# Patient Record
Sex: MALE | HISPANIC OR LATINO | Employment: FULL TIME | URBAN - METROPOLITAN AREA
[De-identification: names, ages, dates, MRNs, and addresses within clinical notes are randomized per-mention and may not be internally consistent; named-entity substitution may affect disease eponyms.]

---

## 2017-04-27 ENCOUNTER — OCCUPATIONAL MEDICINE (OUTPATIENT)
Dept: URGENT CARE | Facility: PHYSICIAN GROUP | Age: 27
End: 2017-04-27
Payer: COMMERCIAL

## 2017-04-27 ENCOUNTER — HOSPITAL ENCOUNTER (OUTPATIENT)
Dept: RADIOLOGY | Facility: MEDICAL CENTER | Age: 27
End: 2017-04-27
Attending: FAMILY MEDICINE
Payer: COMMERCIAL

## 2017-04-27 VITALS
SYSTOLIC BLOOD PRESSURE: 100 MMHG | DIASTOLIC BLOOD PRESSURE: 70 MMHG | TEMPERATURE: 98.6 F | HEART RATE: 55 BPM | WEIGHT: 179 LBS | OXYGEN SATURATION: 97 % | RESPIRATION RATE: 16 BRPM

## 2017-04-27 DIAGNOSIS — Z02.1 PRE-EMPLOYMENT DRUG SCREENING: ICD-10-CM

## 2017-04-27 DIAGNOSIS — S61.512A LACERATION OF LEFT WRIST, INITIAL ENCOUNTER: ICD-10-CM

## 2017-04-27 LAB
AMP AMPHETAMINE: NORMAL
AMP AMPHETAMINE: NORMAL
BREATH ALCOHOL COMMENT: NORMAL
COC COCAINE: NORMAL
COC COCAINE: NORMAL
INT CON NEG: NEGATIVE
INT CON NEG: NORMAL
INT CON POS: NORMAL
INT CON POS: POSITIVE
MET METHAMPHETAMINES: NORMAL
MET METHAMPHETAMINES: NORMAL
OPI OPIATES: NORMAL
OPI OPIATES: NORMAL
PCP PHENCYCLIDINE: NORMAL
PCP PHENCYCLIDINE: NORMAL
POC BREATHALIZER: 0 PERCENT (ref 0–0.01)
POC DRUG COMMENT 753798-OCCUPATIONAL HEALTH: NORMAL
POC DRUG COMMENT 753798-OCCUPATIONAL HEALTH: NORMAL
THC: NORMAL
THC: NORMAL

## 2017-04-27 PROCEDURE — 82075 ASSAY OF BREATH ETHANOL: CPT | Performed by: FAMILY MEDICINE

## 2017-04-27 PROCEDURE — 80305 DRUG TEST PRSMV DIR OPT OBS: CPT | Performed by: FAMILY MEDICINE

## 2017-04-27 PROCEDURE — 90471 IMMUNIZATION ADMIN: CPT | Performed by: FAMILY MEDICINE

## 2017-04-27 PROCEDURE — 90715 TDAP VACCINE 7 YRS/> IM: CPT | Performed by: FAMILY MEDICINE

## 2017-04-27 PROCEDURE — 73110 X-RAY EXAM OF WRIST: CPT | Mod: LT

## 2017-04-27 PROCEDURE — 12001 RPR S/N/AX/GEN/TRNK 2.5CM/<: CPT | Mod: 59 | Performed by: FAMILY MEDICINE

## 2017-04-27 RX ORDER — KETOROLAC TROMETHAMINE 30 MG/ML
60 INJECTION, SOLUTION INTRAMUSCULAR; INTRAVENOUS ONCE
Status: COMPLETED | OUTPATIENT
Start: 2017-04-27 | End: 2017-04-27

## 2017-04-27 RX ADMIN — KETOROLAC TROMETHAMINE 60 MG: 30 INJECTION, SOLUTION INTRAMUSCULAR; INTRAVENOUS at 10:23

## 2017-04-27 NOTE — MR AVS SNAPSHOT
Moralesmadelin Shayo   2017 9:00 AM   Occupational Medicine   MRN: [id number]    Department:  [facility]   Dept Phone:  [phone]    Description:  Male : 1990   Provider:  Zeferino Crawford M.D.           Reason for Visit     Laceration WC metal plate fell down and cut his wrist      Allergies as of 2017     No Known Allergies      You were diagnosed with     Laceration of left wrist, initial encounter   [093867]       Pre-employment drug screening   [168558]         Vital Signs     Blood Pressure Pulse Temperature Respirations Weight Oxygen Saturation    100/70 mmHg 55 37 °C (98.6 °F) 16 81.194 kg (179 lb) 97%    Smoking Status                   Never Smoker            Basic Information     Date Of Birth Sex Race Ethnicity Preferred Language    1990 Male Unable to Obtain  Origin (Moldovan,Albanian,Bruneian,Juan, etc) Moldovan      Health Maintenance     Patient has no pending health maintenance at this time      Results     POCT Breath Alcohol Test      Component Value Standard Range & Units    POC Breathalizer 0.000 0.00 - 0.01 Percent    Breath Alcohol Comment                  POCT 6 Panel Urine Drug Screen      Component    AMPHETAMINE    neg    POC THC    neg    COCAINE    neg    OPIATES    neg    PHENCYCLIDINE    neg    METHAMPHETAMINES    neg    POC Urine Drug Screen Comment    neg    Internal Control Positive    Valid    Internal Control Negative    Valid                        Current Immunizations     Tdap Vaccine 2017 10:43 AM      Below and/or attached are the medications your provider expects you to take. Review all of your home medications and newly ordered medications with your provider and/or pharmacist. Follow medication instructions as directed by your provider and/or pharmacist. Please keep your medication list with you and share with your provider. Update the information when medications are discontinued, doses are changed, or new medications  (including over-the-counter products) are added; and carry medication information at all times in the event of emergency situations     Allergies:  No Known Allergies          Medications  Valid as of: April 27, 2017 - 11:54 AM    Generic Name Brand Name Tablet Size Instructions for use    .                 Medicines prescribed today were sent to:     [facility] [address] [place] [address], [place] - [id number] [facility] [place] [address]    Phone: [phone] Fax: [phone]    Open 24 Hours?: No      Medication refill instructions:       If your prescription bottle indicates you have medication refills left, it is not necessary to call your provider’s office. Please contact your pharmacy and they will refill your medication.    If your prescription bottle indicates you do not have any refills left, you may request refills at any time through one of the following ways: The online [email or web address] system (except Urgent Care), by calling your provider’s office, or by asking your pharmacy to contact your provider’s office with a refill request. Medication refills are processed only during regular business hours and may not be available until the next business day. Your provider may request additional information or to have a follow-up visit with you prior to refilling your medication.   *Please Note: Medication refills are assigned a new Rx number when refilled electronically. Your pharmacy may indicate that no refills were authorized even though a new prescription for the same medication is available at the pharmacy. Please request the medicine by name with the pharmacy before contacting your provider for a refill.        Your To Do List     Future Labs/Procedures Complete By Expires    DX-WRIST-COMPLETE 3+ LEFT  As directed 10/25/2017         [email or web address] Access Code: [email or web address]  Expires: 5/27/2017 11:54 AM    Your email address is not on file at [facility].  Email Addresses are required for you to sign  up for [employer], please contact [phone] to verify your personal information and to provide your email address prior to attempting to register for [employer].    Morales Rafiq  [address] [place] [id number]    [employer]  A secure, online tool to manage your health information     [employer]’s [employer]® is a secure, online tool that connects you to your personalized health information from the privacy of your home -- day or night - making it very easy for you to manage your healthcare. Once the activation process is completed, you can even access your medical information using the [employer] keenan, which is available for free in the Apple Keenan store or Google Play store.     To learn more about [employer], visit [email or web address]    There are two levels of access available (as shown below):   My Chart Features  [facility] Primary Care Doctor [facility]  Specialists [facility]  Urgent  Care Non-[facility] Primary Care Doctor   Email your healthcare team securely and privately 24/7 X X X    Manage appointments: schedule your next appointment; view details of past/upcoming appointments X      Request prescription refills. X      View recent personal medical records, including lab and immunizations X X X X   View health record, including health history, allergies, medications X X X X   Read reports about your outpatient visits, procedures, consult and ER notes X X X X   See your discharge summary, which is a recap of your hospital and/or ER visit that includes your diagnosis, lab results, and care plan X X  X     How to register for [employer]:  Once your e-mail address has been verified, follow the following steps to sign up for [employer].     1. Go to  [email or web address]  2. Click on the Sign Up Now box, which takes you to the New Member Sign Up page. You will need to provide the following information:  a. Enter your [employer] Access Code exactly as it appears at the top of this page. (You will not need to use this code after you’ve  completed the sign-up process. If you do not sign up before the expiration date, you must request a new code.)   b. Enter your date of birth.   c. Enter your home email address.   d. Click Submit, and follow the next screen’s instructions.  3. Create a ScaleBaset ID. This will be your [employer] login ID and cannot be changed, so think of one that is secure and easy to remember.  4. Create a WeDidIt password. You can change your password at any time.  5. Enter your Password Reset Question and Answer. This can be used at a later time if you forget your password.   6. Enter your e-mail address. This allows you to receive e-mail notifications when new information is available in WeDidIt.  7. Click Sign Up. You can now view your health information.    For assistance activating your [employer] account, call [phone]

## 2017-04-27 NOTE — Clinical Note
EMPLOYEE’S CLAIM FOR COMPENSATION/ REPORT OF INITIAL TREATMENT  FORM C-4    EMPLOYEE’S CLAIM - PROVIDE ALL INFORMATION REQUESTED   First Name  Andrew Last Name  Rafiq Birthdate                    1990                Sex  male Claim Number   Home Address  [address] Age  27 y.o. Height   Weight  81.194 kg (179 lb) [place] [place]     [place]  [address] Telephone  [phone] (home)    Mailing Address  [address] [place]  [address] Primary Language Spoken  Portuguese    Insurer  [employer] [facility] self Insured Third Party   [facility]   Employee's Occupation (Job Title) When Injury or Occupational Disease Occurred  [employer]    Employer's Name    [employer] Telephone  [phone]    Employer Address   [address]  [address]      Date of Injury  4/27/2017               Hour of Injury  8:30 AM Date Employer Notified  4/27/2017 Last Day of Work after Injury or Occupational Disease  4/27/2017 Supervisor to Whom Injury Reported  Souleymane Salazar   Address or Location of Accident (if applicable)  [[facility] [address]]   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 (Be specific an answer in detail. 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  n/a Witnesses to the Accident  Lorenzo Rodriguez      Nature of Injury or Occupational Disease  Laceration  Part(s) of Body Injured or Affected  Wrist (L) and Hand (L), N/A, N/A    I certify that the above is true and correct to the best of my knowledge and that I have provided this information in order to obtain the benefits of [place]  Insurance [place] or [place]).  I hereby authorize any physician, chiropractor, surgeon, practitioner, or other person, any hospital, including [facility] hospital, any medical service organization, any insurance company, or other institution or organization to release to each other, any medical or other information, including benefits paid or payable, pertinent to this injury or disease, except information relative to diagnosis, treatment and/or counseling for AIDS, psychological conditions, alcohol or controlled substances, for which I must give specific authorization.  A Photostat of this authorization shall be as valid as the original.     Date   Place   Employee’s Signature   THIS REPORT MUST BE COMPLETED AND MAILED WITHIN 3 WORKING DAYS OF TREATMENT   Place  [facility]  Name of Facility  [place]   Date  4/27/2017 Diagnosis  (S61.512A) Laceration of left wrist, initial encounter  (Z02.1) Pre-employment drug screening Is there evidence the injured employee was under the influence of alcohol and/or another controlled substance at the time of accident?   Hour  9:41 AM Description of Injury or Disease  Diagnoses of Laceration of left wrist, initial encounter and Pre-employment drug screening were pertinent to this visit. No   Treatment   Laceration of left wrist, initial encounter  Xray personally reviewed.  There is no foreign body or fracture.   some improvement after toradol injection    Wound repaired with dermabond    Work restrictions per D39    RTC in 3-5  d for wound check.                  Tetanus vaccination given.  Have you advised the patient to remain off work five days or more? No   X-Ray Findings  Negative   If Yes   From Date  To Date      From information given by the employee, together with medical evidence, can you directly connect this injury or occupational disease as job incurred?  Yes  "If No Full Duty  No Modified Duty  Yes   Is additional medical care by a physician indicated?  Yes If Modified Duty, Specify any Limitations / Restrictions  Restrictions per D39\     Do you know of any previous injury or disease contributing to this condition or occupational disease?                            No   Date  4/27/2017 Print Doctor’s Name Zeferino Crawford M.D. I certify the employer’s copy of  this form was mailed on:   Address  [address] Insurer’s Use Only     [place]  [address]    Provider’s Tax ID Number  [id number]  Telephone  Dept: [phone]        e-ZEFERINO Horton M.D.   e-Signature: Dr. Chaz Germain, Medical Director Degree  MD        ORIGINAL-TREATING PHYSICIAN OR CHIROPRACTOR    PAGE 2-INSURER/TPA    PAGE 3-EMPLOYER    PAGE 4-EMPLOYEE             Form C-4 (rev10/07)              BRIEF DESCRIPTION OF RIGHTS AND BENEFITS  (Pursuant to [place]RS 616C.050)    Notice of Injury or Occupational Disease (Incident Report Form C-1): If an injury or occupational disease (OD) arises out of and in the  course of employment, you must provide written notice to your employer as soon as practicable, but no later than 7 days after the accident or  OD. Your employer shall maintain a sufficient supply of the required forms.    Claim for Compensation (Form C-4): If medical treatment is sought, the form C-4 is available at the place of initial treatment. A completed  \"Claim for Compensation\" (Form C-4) must be filed within 90 days after an accident or OD. The treating physician or chiropractor must,  within 3 working days after treatment, complete and mail to the employer, the employer's insurer and third-party , the Claim for  Compensation.    Medical Treatment: If you require medical treatment for your on-the-job injury or OD, you may be required to select a physician or  chiropractor from a list provided by your workers’ compensation insurer, if it has " contracted with an Organization for Managed Care (MCO) or  Preferred Provider Organization (PPO) or providers of health care. If your employer has not entered into a contract with an MCO or PPO, you  may select a physician or chiropractor from the Panel of Physicians and Chiropractors. Any medical costs related to your industrial injury or  OD will be paid by your insurer.    Temporary Total Disability (TTD): If your doctor has certified that you are unable to work for a period of at least 5 consecutive days, or 5  cumulative days in a 20-day period, or places restrictions on you that your employer does not accommodate, you may be entitled to TTD  compensation.    Temporary Partial Disability (TPD): If the wage you receive upon reemployment is less than the compensation for TTD to which you are  entitled, the insurer may be required to pay you TPD compensation to make up the difference. TPD can only be paid for a maximum of 24  months.    Permanent Partial Disability (PPD): When your medical condition is stable and there is an indication of a PPD as a result of your injury or  OD, within 30 days, your insurer must arrange for an evaluation by a rating physician or chiropractor to determine the degree of your PPD. The  amount of your PPD award depends on the date of injury, the results of the PPD evaluation and your age and wage.    Permanent Total Disability (PTD): If you are medically certified by a treating physician or chiropractor as permanently and totally disabled  and have been granted a PTD status by your insurer, you are entitled to receive monthly benefits not to exceed 66 2/3% of your average  monthly wage. The amount of your PTD payments is subject to reduction if you previously received a PPD award.    Vocational Rehabilitation Services: You may be eligible for vocational rehabilitation services if you are unable to return to the job due to a  permanent physical impairment or permanent restrictions as a  result of your injury or occupational disease.    Transportation and Per Rula Reimbursement: You may be eligible for travel expenses and per rula associated with medical treatment.    Reopening: You may be able to reopen your claim if your condition worsens after claim closure.    Appeal Process: If you disagree with a written determination issued by the insurer or the insurer does not respond to your request, you may  appeal to the Department of Administration, , by following the instructions contained in your determination letter. You must  appeal the determination within 70 days from the date of the determination letter at [address], [address], [place], [place]  [address], or [address], [address], [place], [place] [address]. If you disagree with the  decision, you may appeal to the  Department of Administration, . You must file your appeal within 30 days from the date of the  decision  letter at [address], [address], [place], [place] [address], or [address], [place], [place] [address]. If you  disagree with a decision of an , you may file a petition for judicial review with the District Court. You must do so within 30  days of the Appeal Officer’s decision. You may be represented by an  at your own expense or you may contact the [facility] for possible  representation.    [place]  for Injured Workers (NAIW): If you disagree with a  decision, you may request that NAIW represent you  without charge at an  Hearing. For information regarding denial of benefits, you may contact the [facility] at: [address], [address], [place] [address], [phone], or [address], [address], [place] [address], [phone]    To File a Complaint with the Division: If you wish to file a complaint with the  of the Division of  Industrial Relations (DIR),  please contact the Workers’ Compensation Section, [address], [address], [place], [place] [address], telephone [phone], or  [address], [address], [place], [place] [address], telephone [phone].    For assistance with Workers’ Compensation Issues: you may contact the Office of the [facility] Consumer Health Assistance, [address] [place] [address], [address], [place], [place] [address], Toll Free 1-777.512.8673, Web site: http://govBoracci.[facility].nv., E-mail  [email or web address].                                                                                                                                                                                                                                   __________________________________________________________________                                                                   _________________                Employee Name / Signature                                                                                                                                                       Date                                                                                                                                                                                                     D-2 (rev. 10/07)

## 2017-04-27 NOTE — PROGRESS NOTES
Subjective:      Chief Complaint   Patient presents with   • Laceration     WC metal plate fell down and cut his wrist                Laceration   The incident occurred less than 1 hour ago.  location: left wrist - states he a 2x4 with a metal plate fell on left wrist and he sustained a small laceration.   He now has constant wrist soreness, worse with movement.    The laceration is 2 cm in size. The laceration mechanism was a metal edge. The pain is moderate. The pain has been constant since onset.  tetanus status is: not current        Past medical history was unremarkable and not pertinent to current issue  Social hx - denies tobacco, alcohol, drug use  Family hx was reviewed - no pertinent past family hx    Review of Systems   Cardio - denies chest pain  resp - denies SOB.   Neurological: Negative for tingling, sensory change and focal weakness.   All other systems reviewed and are negative.         Objective:     Blood pressure 100/70, pulse 55, temperature 37 °C (98.6 °F), resp. rate 16, weight 81.194 kg (179 lb), SpO2 97 %.      Physical Exam   Constitutional: pt is oriented to person, place, and time. Pt appears well-developed. No distress.   HENT:   Head: Normocephalic and atraumatic.   Eyes: Conjunctivae are normal.   Cardiovascular: Normal rate.    Pulmonary/Chest: Effort normal.   Musculoskeletal:   Pt exhibits tenderness over left wrist.   There is a superficial linear 2.5 cm lac.   there is no active bleeding .    normal range of motion and normal capillary refill. Normal sensation noted. Normal strength noted.           Neurological: He is alert and oriented to person, place, and time.   Skin: Skin is warm. Pt is not diaphoretic. No erythema.   Psychiatric:  behavior is normal.   Nursing note and vitals reviewed.         Narrative        4/27/2017 10:22 AM    HISTORY/REASON FOR EXAM:  Laceration left wrist.      TECHNIQUE/EXAM DESCRIPTION AND NUMBER OF VIEWS:  4 views of the LEFT wrist.    COMPARISON:  None    FINDINGS:      There is focal swelling over the volar aspect of the left wrist and palm of the hand.    There is no evidence of displaced  fracture or dislocation.    There is no evidence of a radiopaque foreign body.         Impression        1.  There is focal swelling and soft tissue injury consistent with history of laceration. There is no foreign body or fracture.         Reading Provider Reading Date     Beverly Oreilly M.D. Apr 27, 2017        lac repair:  The wound was thoroughly irrigated with copious amount of normal saline.   Area was then prepped in the usual sterile fashion.    Wound was cleansed   Wound explored and found to be relatively superficial and no foreign bodies appreciated.  I approximated the wound edges and closed the laceration with several layers of dermabond reinforced with steri srips.  Area was then cleansed and dressed.    Wound care instructions and ER precautions given.         Assessment/Plan:          1. Laceration of left wrist, initial encounter  Xray personally reviewed.  There is no foreign body or fracture.   some improvement after toradol injection    Wound repaired with dermabond    Work restrictions per D39    RTC in 3-5  d for wound check.                  Tetanus vaccination given.          Rx bactrim for wound infection prophylaxis        Tetanus vaccination given.

## 2017-04-27 NOTE — Clinical Note
[facility] [address] [facility] [place] [address]  Phone: [phone] - Fax: [phone]        [facility] Progress Report and Disability Certification  Date of Service: 4/27/2017   No Show:  No  Date / Time of Next Visit: 4/27/2017   Claim Information   Patient Name: Andrew Conroy  Claim Number:     Employer:   [employer]   Date of Injury: 4/27/2017     Insurer / TPA: [employer]  ID / SSN:    Occupation: [employer]  Diagnosis: Diagnoses of Laceration of left wrist, initial encounter and Pre-employment drug screening were pertinent to this visit.    Medical Information   Related to Industrial Injury? Yes    Subjective Complaints:  Laceration   The incident occurred less than 1 hour ago.  location: left wrist - states he a 2x4 with a metal plate fell on left wrist and he sustained a small laceration.   He now has constant wrist soreness, worse with movement.    The laceration is 2 cm in size. The laceration mechanism was a metal edge. The pain is mild. The pain has been constant since onset.  tetanus status is: not current   Objective Findings: Musculoskeletal:   Pt exhibits tenderness over left wrist.   There is a superficial linear 2.5 cm lac.   there is no active bleeding .    normal range of motion and normal capillary refill. Normal sensation noted. Normal strength noted.    Pre-Existing Condition(s):     Assessment:   Initial Visit    Status: Additional Care Required  Permanent Disability:No    Plan:      Diagnostics: X-ray  Comments:negative    Comments:       Disability Information   Status: Released to Restricted Duty    From:  4/27/2017  Through: 4/27/2017 Restrictions are:     Physical Restrictions   Sitting:    Standing:    Stooping:    Bending:      Squatting:    Walking:    Climbing:    Pushing:      Pulling:    Other:    Reaching Above Shoulder (L):   Reaching Above Shoulder (R):       Reaching Below Shoulder (L):    Reaching  Below Shoulder (R):      Not to exceed Weight Limits   Carrying(hrs):   Weight Limit(lb): < or = to 10 pounds Lifting(hrs):   Weight  Limit(lb): < or = to 10 pounds   Comments:  Laceration of left wrist, initial encounter  Xray personally reviewed.  There is no foreign body or fracture.   some improvement after toradol injection    Wound repaired with dermabond    Work restrictions per D39    RTC in 3-5  d for wound check.             Tetanus vaccination given.    Repetitive Actions   Hands: i.e. Fine Manipulations from Grasping: < or = to 4 hrs/day  Comments:limit use left hand and advised to wear gloves and keep wound covered   Feet: i.e. Operating Foot Controls:     Driving / Operate Machinery:     Physician Name: Zeferino Crawford M.D. Physician Signature: ZEFERINO Marie M.D. e-Signature: Dr. Chaz Germain, Medical Director   Clinic Name / Location: [address] [address] Clinic Phone Number: Dept: [phone]   Appointment Time: 9:00 Am Visit Start Time: 9:41 AM   Check-In Time:  9:16 Am Visit Discharge Time:  11:50 AM   Original-Treating Physician or Chiropractor    Page 2-Insurer/TPA    Page 3-Employer    Page 4-Employee

## 2017-05-01 ENCOUNTER — OCCUPATIONAL MEDICINE (OUTPATIENT)
Dept: URGENT CARE | Facility: PHYSICIAN GROUP | Age: 27
End: 2017-05-01
Payer: COMMERCIAL

## 2017-05-01 VITALS
HEART RATE: 60 BPM | WEIGHT: 168 LBS | DIASTOLIC BLOOD PRESSURE: 60 MMHG | RESPIRATION RATE: 16 BRPM | TEMPERATURE: 98.4 F | SYSTOLIC BLOOD PRESSURE: 122 MMHG | OXYGEN SATURATION: 97 %

## 2017-05-01 DIAGNOSIS — S61.512D LACERATION OF LEFT WRIST, SUBSEQUENT ENCOUNTER: ICD-10-CM

## 2017-05-01 PROCEDURE — 99212 OFFICE O/P EST SF 10 MIN: CPT | Mod: 29 | Performed by: NURSE PRACTITIONER

## 2017-05-01 ASSESSMENT — ENCOUNTER SYMPTOMS
VOMITING: 0
FEVER: 0
FOCAL WEAKNESS: 0
NAUSEA: 0
SENSORY CHANGE: 0
BACK PAIN: 0
NECK PAIN: 0
TINGLING: 0
CHILLS: 0
ABDOMINAL PAIN: 0
MYALGIAS: 0

## 2017-05-01 NOTE — MR AVS SNAPSHOT
Rafiq Soaresr   2017 8:00 AM   Occupational Medicine   MRN: [id number]    Department:  [facility]   Dept Phone:  [phone]    Description:  Male : 1990   Provider:  BRIAN Bowie           Reason for Visit     Follow-Up WC for wrist injury      Allergies as of 2017     No Known Allergies      You were diagnosed with     Laceration of left wrist, subsequent encounter   [[id number]]         Vital Signs     Blood Pressure Pulse Temperature Respirations Weight Oxygen Saturation    122/60 mmHg 60 36.9 °C (98.4 °F) 16 76.204 kg (168 lb) 97%    Smoking Status                   Never Smoker            Basic Information     Date Of Birth Sex Race Ethnicity Preferred Language    1990 Male Unable to Obtain  Origin (Croatian,Ivorian,Nepalese,Juan, etc) Croatian      Health Maintenance        Date Due Completion Dates    IMM HEP B VACCINE (1 of 3 - Primary Series) 1990 ---    IMM HEP A VACCINE (1 of 2 - Standard Series) 3/22/1992 ---    IMM HPV VACCINE (1 of 3 - Male 3 Dose Series) 3/22/2002 ---    IMM VARICELLA (CHICKENPOX) VACCINE (1 of 2 - 2 Dose Adolescent Series) 3/22/2004 ---    IMM DTaP/Tdap/Td Vaccine (2 - Td) 2027            Current Immunizations     Tdap Vaccine 2017 10:43 AM      Below and/or attached are the medications your provider expects you to take. Review all of your home medications and newly ordered medications with your provider and/or pharmacist. Follow medication instructions as directed by your provider and/or pharmacist. Please keep your medication list with you and share with your provider. Update the information when medications are discontinued, doses are changed, or new medications (including over-the-counter products) are added; and carry medication information at all times in the event of emergency situations     Allergies:  No Known Allergies          Medications  Valid as of: May 01, 2017 -  8:33 AM    Generic  Name Brand Name Tablet Size Instructions for use    .                 Medicines prescribed today were sent to:     [facility] [address] [place] [address] [address] [facility]    [address] [facility] [address]    Phone: [phone] Fax: [phone]    Open 24 Hours?: No      Medication refill instructions:       If your prescription bottle indicates you have medication refills left, it is not necessary to call your provider’s office. Please contact your pharmacy and they will refill your medication.    If your prescription bottle indicates you do not have any refills left, you may request refills at any time through one of the following ways: The online [employer] system (except Urgent Care), by calling your provider’s office, or by asking your pharmacy to contact your provider’s office with a refill request. Medication refills are processed only during regular business hours and may not be available until the next business day. Your provider may request additional information or to have a follow-up visit with you prior to refilling your medication.   *Please Note: Medication refills are assigned a new Rx number when refilled electronically. Your pharmacy may indicate that no refills were authorized even though a new prescription for the same medication is available at the pharmacy. Please request the medicine by name with the pharmacy before contacting your provider for a refill.           [employer] Access Code: [email or web address]  Expires: 5/27/2017 11:54 AM    Your email address is not on file at [facility].  Email Addresses are required for you to sign up for [employer], please contact [phone] to verify your personal information and to provide your email address prior to attempting to register for [employer].    Rafiq Morales  [address] [place] [id number]    [employer]  A secure, online tool to manage your health information     [employer] [facility]’s [employer]® is a secure, online tool that connects you to your  personalized health information from the privacy of your home -- day or night - making it very easy for you to manage your healthcare. Once the activation process is completed, you can even access your medical information using the HipWay keenan, which is available for free in the Apple Keenan store or Google Play store.     To learn more about HipWay, visit [email or web address]    There are two levels of access available (as shown below):   My Chart Features  [facility] Doctor [facility]  Specialists [facility]  Urgent  Care [facility] Primary Care Doctor   Email your healthcare team securely and privately 24/7 X X X    Manage appointments: schedule your next appointment; view details of past/upcoming appointments X      Request prescription refills. X      View recent personal medical records, including lab and immunizations X X X X   View health record, including health history, allergies, medications X X X X   Read reports about your outpatient visits, procedures, consult and ER notes X X X X   See your discharge summary, which is a recap of your hospital and/or ER visit that includes your diagnosis, lab results, and care plan X X  X     How to register for HipWay:  Once your e-mail address has been verified, follow the following steps to sign up for [employer]Way.     1. Go to  [email or web address]  2. Click on the Sign Up Now box, which takes you to the New Member Sign Up page. You will need to provide the following information:  a. Enter your HipWay Access Code exactly as it appears at the top of this page. (You will not need to use this code after you’ve completed the sign-up process. If you do not sign up before the expiration date, you must request a new code.)   b. Enter your date of birth.   c. Enter your home email address.   d. Click Submit, and follow the next screen’s instructions.  3. Create a HipWay ID. This will be your HipWay login ID and cannot be changed, so think of one that is secure and  easy to remember.  4. Create a Digitwhiz password. You can change your password at any time.  5. Enter your Password Reset Question and Answer. This can be used at a later time if you forget your password.   6. Enter your e-mail address. This allows you to receive e-mail notifications when new information is available in [employer].  7. Click Sign Up. You can now view your health information.    For assistance activating your [employer]it[employer] account, call [phone]

## 2017-05-01 NOTE — Clinical Note
[facility] [address] [place] [address]  Phone: [phone] - Fax: [phone]        [facility] Progress Report and Disability Certification  Date of Service: 5/1/2017   No Show:  No  Date / Time of Next Visit:     Claim Information   Patient Name: Rafiq Morales  Claim Number:     Employer:   [employer] Date of Injury: 4/27/2017     Insurer / TPA:   [employer] ID / SSN:     Occupation: [employer]  Diagnosis: The encounter diagnosis was Laceration of left wrist, subsequent encounter.    Medical Information   Related to Industrial Injury?  yes   Subjective Complaints:  DOI 4-20 7-17  . Patient .lacerated his left wrist with a metal plate on a 2.4 that fell. Patient returns for recheck. The laceration is healing well.  Patient denies any pain. He denies any limited range of motion to his wrist and distal fingers.   Objective Findings: Patient is awake alert no acute distress. The wound is healing well,  No signs of erythema, drainage, swelling. He has good range of motion to his left wrist, good range of motion to his distal fingers.   Pre-Existing Condition(s):     Assessment:   Condition Improved    Status: Discharged /  MMI  Permanent Disability:No    Plan:      Diagnostics:      Comments:       Disability Information   Status: Released to Full Duty    From:     Through:   Restrictions are:     Physical Restrictions   Sitting:    Standing:    Stooping:    Bending:      Squatting:    Walking:    Climbing:    Pushing:      Pulling:    Other:    Reaching Above Shoulder (L):   Reaching Above Shoulder (R):       Reaching Below Shoulder (L):    Reaching Below Shoulder (R):      Not to exceed Weight Limits   Carrying(hrs):   Weight Limit(lb):   Lifting(hrs):   Weight  Limit(lb):     Comments: Return for any problems or concerns    Repetitive Actions   Hands: i.e. Fine Manipulations from Grasping:     Feet: i.e. Operating Foot Controls:      Driving / Operate Machinery:     Physician Name: BRIAN Bowie Physician Signature: humberto-NYLA Pepper e-Signature: Dr. Chaz Germain, Medical Director   Clinic Name / Location: [address] [address] Clinic Phone Number: Dept: [phone]   Appointment Time: 8:00 Am Visit Start Time: 8:15 AM   Check-In Time:  8:11 Am Visit Discharge Time: 8:32 AM   Original-Treating Physician or Chiropractor    Page 2-Insurer/TPA    Page 3-Employer    Page 4-Employee

## 2017-05-01 NOTE — PROGRESS NOTES
Subjective:      Rafiq Morales is a 27 y.o. male who presents with Follow-Up      DOI 4-20 7-17  . Patient .lacerated his left wrist with a metal plate on a 2.4 that fell. Patient returns for recheck. The laceration is healing well.  Patient denies any pain. He denies any limited range of motion to his wrist and distal fingers.     HPI Comments: Medications, Allergies and Prior Medical Hx reviewed and updated in [facility].with patient/family today           Review of Systems   Constitutional: Negative for fever, chills and malaise/fatigue.   Gastrointestinal: Negative for nausea, vomiting and abdominal pain.   Musculoskeletal: Negative for myalgias, back pain, joint pain and neck pain.   Skin: Negative for rash.   Neurological: Negative for tingling, sensory change and focal weakness.          Objective:     /60 mmHg  Pulse 60  Temp(Src) 36.9 °C (98.4 °F)  Resp 16  Wt 76.204 kg (168 lb)  SpO2 97%     Physical Exam   Constitutional: He appears well-developed and well-nourished. No distress.   HENT:   Head: Normocephalic and atraumatic.   Eyes: Conjunctivae are normal. Pupils are equal, round, and reactive to light.   Neck: Neck supple.   Cardiovascular: Normal rate.    Pulmonary/Chest: Effort normal. No respiratory distress.   Neurological: He is alert.   Awake, alert, answering questions appropriately, moving all extremeties   Skin: Skin is warm and dry. No rash noted.   Psychiatric: He has a normal mood and affect. His behavior is normal.   Vitals reviewed.      Patient is awake alert no acute distress. The wound is healing well,  No signs of erythema, drainage, swelling. He has good range of motion to his left wrist, good range of motion to his distal fingers.       Assessment/Plan:     1. Laceration of left wrist, subsequent encounter         Work restrictions as documented on D 39 form - none  Pt is discharged MMI  Ffollow-up at a [facility]  for any problems or concerns.   Discharge instructions discussed  with pt/family who verbalize understanding and agreement with poc